# Patient Record
Sex: MALE | Race: WHITE | NOT HISPANIC OR LATINO | URBAN - METROPOLITAN AREA
[De-identification: names, ages, dates, MRNs, and addresses within clinical notes are randomized per-mention and may not be internally consistent; named-entity substitution may affect disease eponyms.]

---

## 2019-04-06 ENCOUNTER — OFFICE VISIT (OUTPATIENT)
Dept: URGENT CARE | Facility: CLINIC | Age: 54
End: 2019-04-06
Payer: COMMERCIAL

## 2019-04-06 VITALS
WEIGHT: 174 LBS | SYSTOLIC BLOOD PRESSURE: 132 MMHG | TEMPERATURE: 96.5 F | RESPIRATION RATE: 16 BRPM | OXYGEN SATURATION: 97 % | HEIGHT: 67 IN | DIASTOLIC BLOOD PRESSURE: 74 MMHG | HEART RATE: 80 BPM | BODY MASS INDEX: 27.31 KG/M2

## 2019-04-06 DIAGNOSIS — R42 DIZZINESS AND GIDDINESS: Primary | ICD-10-CM

## 2019-04-06 PROCEDURE — G0382 LEV 3 HOSP TYPE B ED VISIT: HCPCS | Performed by: NURSE PRACTITIONER

## 2019-04-06 RX ORDER — ROPINIROLE 1 MG/1
1 TABLET, FILM COATED ORAL
COMMUNITY

## 2021-04-13 DIAGNOSIS — Z23 ENCOUNTER FOR IMMUNIZATION: ICD-10-CM

## 2021-04-22 ENCOUNTER — IMMUNIZATIONS (OUTPATIENT)
Dept: FAMILY MEDICINE CLINIC | Facility: HOSPITAL | Age: 56
End: 2021-04-22

## 2021-04-22 DIAGNOSIS — Z23 ENCOUNTER FOR IMMUNIZATION: Primary | ICD-10-CM

## 2021-04-22 PROCEDURE — 91300 SARS-COV-2 / COVID-19 MRNA VACCINE (PFIZER-BIONTECH) 30 MCG: CPT | Performed by: NURSE PRACTITIONER

## 2021-04-22 PROCEDURE — 0001A SARS-COV-2 / COVID-19 MRNA VACCINE (PFIZER-BIONTECH) 30 MCG: CPT | Performed by: NURSE PRACTITIONER

## 2021-05-21 ENCOUNTER — IMMUNIZATIONS (OUTPATIENT)
Dept: FAMILY MEDICINE CLINIC | Facility: HOSPITAL | Age: 56
End: 2021-05-21

## 2021-05-21 DIAGNOSIS — Z23 ENCOUNTER FOR IMMUNIZATION: Primary | ICD-10-CM

## 2021-05-21 PROCEDURE — 91300 SARS-COV-2 / COVID-19 MRNA VACCINE (PFIZER-BIONTECH) 30 MCG: CPT

## 2021-05-21 PROCEDURE — 0002A SARS-COV-2 / COVID-19 MRNA VACCINE (PFIZER-BIONTECH) 30 MCG: CPT

## 2022-12-17 ENCOUNTER — OFFICE VISIT (OUTPATIENT)
Dept: URGENT CARE | Facility: CLINIC | Age: 57
End: 2022-12-17

## 2022-12-17 VITALS
DIASTOLIC BLOOD PRESSURE: 97 MMHG | TEMPERATURE: 97.9 F | RESPIRATION RATE: 18 BRPM | OXYGEN SATURATION: 98 % | HEART RATE: 68 BPM | SYSTOLIC BLOOD PRESSURE: 158 MMHG

## 2022-12-17 DIAGNOSIS — M65.311 TRIGGER FINGER OF RIGHT THUMB: Primary | ICD-10-CM

## 2022-12-17 RX ORDER — GABAPENTIN 600 MG/1
TABLET ORAL
COMMUNITY
Start: 2022-12-03

## 2022-12-17 RX ORDER — CLONAZEPAM 0.5 MG/1
0.5 TABLET ORAL
COMMUNITY
Start: 2022-12-02

## 2022-12-17 RX ORDER — GABAPENTIN 300 MG/1
CAPSULE ORAL
COMMUNITY
Start: 2022-09-27

## 2022-12-17 RX ORDER — ATORVASTATIN CALCIUM 10 MG/1
10 TABLET, FILM COATED ORAL DAILY
COMMUNITY
Start: 2022-12-06

## 2022-12-17 RX ORDER — GABAPENTIN 300 MG/1
CAPSULE ORAL
COMMUNITY
Start: 2022-12-03

## 2022-12-17 NOTE — PROGRESS NOTES
[facility]        NAME: Leanna Montoya is a 61 y o  male  : 1965    MRN: [id number]  DATE: 2022  TIME: 11:09 AM    Assessment and Plan   Trigger finger of right thumb [M65 311]  1  Trigger finger of right thumb  Ambulatory Referral to Hand Surgery    Ambulatory Referral to Physical Therapy            Patient Instructions   Over-the-counter thumb spica brace  Tylenol and ibuprofen  Gentle range of motion  Physical therapy  Orthopedic surgery  Follow up with PCP in 3-5 days  Proceed to  ER if symptoms worsen  Chief Complaint     Chief Complaint   Patient presents with   • Thumb Pain     Pt reports right thumb pain and stiffness for 2 weeks  History of Present Illness       Patient is a 26-year-old male with no significant past medical history presents the office complaining of right thumb pain for 2 weeks  Pain is rated 6/10  States it gets stuck in a flexed position and he has to forcibly extend it to release  States this causes most noticeable pain  He also reports decreased sensation to the tip of the thumb  Denies any injury or trauma  He made at home splint but states it was to be annoying to wear  He has not been using anything for pain  He is right-hand dominant  Review of Systems   Review of Systems   Musculoskeletal: Positive for arthralgias and joint swelling           Current Medications       Current Outpatient Medications:   •  atorvastatin (LIPITOR) 10 mg tablet, Take 10 mg by mouth daily, Disp: , Rfl:   •  gabapentin (NEURONTIN) 300 mg capsule, Take 1 capsule (300 mg) by mouth at bedtime, three capsules at 8 PM, 3 capsules at 1 AM , Disp: , Rfl:   •  gabapentin (NEURONTIN) 300 mg capsule, TAKE 1 CAPSULE BY MOUTH AT 5:00 PM, TAKE 1 CAPSULE AT 8:00 PM, AND TAKE 1 CAPSULE AT 1:00 AM  TAKE ALONG WITH 600 MG TABLET AT 8:00 PM AND 1:00 AM , Disp: , Rfl:   •  gabapentin (NEURONTIN) 600 MG tablet, TAKE 1 TABLET BY MOUTH AT 8:00 PM, AND TAKE 1 TABLET BY MOUTH AT 1:00 AM  TAKE ALONG WITH 300 MG CAPSULE AT 8:00PM AND 1:00AM , Disp: , Rfl:   •  rOPINIRole (REQUIP) 1 mg tablet, Take 1 mg by mouth daily at bedtime, Disp: , Rfl:   •  clonazePAM (KlonoPIN) 0 5 mg tablet, Take 0 5 mg by mouth daily at bedtime (Patient not taking: Reported on 12/17/2022), Disp: , Rfl:   •  Gabapentin Enacarbil  MG TBCR, Take 300 mg by mouth 2 (two) times a day (Patient not taking: Reported on 12/17/2022), Disp: , Rfl:     Current Allergies     Allergies as of 12/17/2022   • (No Known Allergies)            The following portions of the patient's history were reviewed and updated as appropriate: allergies, current medications, past family history, past medical history, past social history, past surgical history and problem list      History reviewed  No pertinent past medical history  History reviewed  No pertinent surgical history  History reviewed  No pertinent family history  Medications have been verified  Objective   /97   Pulse 68   Temp 97 9 °F (36 6 °C)   Resp 18   SpO2 98%   No LMP for male patient  Physical Exam     Physical Exam  Vitals and nursing note reviewed  Constitutional:       Appearance: He is well-developed  HENT:      Head: Normocephalic and atraumatic  Right Ear: External ear normal       Left Ear: External ear normal       Nose: Nose normal    Eyes:      General: Lids are normal       Conjunctiva/sclera: Conjunctivae normal    Musculoskeletal:      Right hand: Swelling and bony tenderness (MCP, proximal phalanx, and PIP 1st digit) present  Decreased range of motion (Notable pop with thumb flexion and extension)  Decreased sensation (Decreased sensation to the tip of right thumb)  Normal capillary refill  Normal pulse  Skin:     General: Skin is warm and dry  Capillary Refill: Capillary refill takes less than 2 seconds  Findings: No rash  Neurological:      Mental Status: He is alert

## 2022-12-19 NOTE — TELEPHONE ENCOUNTER
Patient is being referred to orthopedics  Please schedule accordingly      [facility]   [phone]

## 2022-12-19 NOTE — TELEPHONE ENCOUNTER
Caller:Patient    Doctor: hand specialist    Reason for call: patient calling to schedule an appt   Advised the  will be reaching out to him    Call back#: [phone]

## 2023-01-23 ENCOUNTER — EVALUATION (OUTPATIENT)
Dept: OCCUPATIONAL THERAPY | Facility: CLINIC | Age: 58
End: 2023-01-23

## 2023-01-23 DIAGNOSIS — M65.311 TRIGGER FINGER OF RIGHT THUMB: ICD-10-CM

## 2023-01-23 PROBLEM — M65.30 TRIGGER FINGER OF RIGHT HAND: Status: ACTIVE | Noted: 2023-01-23

## 2023-01-23 NOTE — PROGRESS NOTES
OT Evaluation     Today's date: 2023  Patient name: Paul Diaz  : 1965  MRN: [id number]  Referring provider: Maddi Loya, *  Dx:   Encounter Diagnosis     ICD-10-CM    1  Trigger finger of right thumb  M65 311 Ambulatory Referral to Physical Therapy                     Assessment  Assessment details: Patient present on  with R trigger finger of thumb  Patient has WFL in wrist and digits and complains of pain only with function  Palpation of thumb resulted in clicking of R IP and MCP joint of thumb  Patient has no complaints of tenderness at R MCP  Patient is a  has trouble lifting and grasping objects at work     Impairments: pain with function  Functional limitations: Lifting objects at workUnderstanding of Dx/Px/POC: good   Prognosis: good    Goals  STG:  Adhere to HEP by 1wk  Decrease pain with function by 1    LTG:  Decrease at most pain with function to 2   Decrease resting pain to 1 by discharge  Increase FOTO score WFL by discharge    Plan  Patient would benefit from: custom splinting, skilled occupational therapy and OT eval  Planned modality interventions: cryotherapy and hydrotherapy  Planned therapy interventions: joint mobilization, behavior modification, manual therapy, massage, therapeutic exercise, therapeutic activities, functional ROM exercises, graded activity, graded exercise and home exercise program  Frequency: 2x week  Duration in visits: 10  Duration in weeks: 5  Plan of Care beginning date: 2023  Plan of Care expiration date: 2023  Treatment plan discussed with: patient        Subjective Evaluation    History of Present Illness  Date of onset: 12/10/2022          Not a recurrent problem   Quality of life: good    Pain  Current pain ratin  At best pain ratin  At worst pain rating: 3  Location: right mcp of thumb  Quality: discomfort  Relieving factors: rest    Social Support  Steps to enter house: no  Stairs in house: yes   Lives in: one-story house  Lives with: spouse and young children    Employment status: working ([employer])  Hand dominance: right          Objective     Palpation     Additional Palpation Details  Clicking of R IP thumb  No complaints of tenderness around right MCP or IP of thumb  Denies paraesthesia     Active Range of Motion     Right Wrist   Wrist flexion: WFL  Wrist extension: WFl  Radial deviation: WFL  Ulnar deviation: WFL    Right Thumb   Flexion     CMC: 35    MP: 65    DIP: 50  Palmar Abduction    CMC: 78  Opposition: WFL, can slide thumb down 5th digit to distal palmar crease   WFL on thumb extension    Additional Active Range of Motion Details  Right wrist WFL, no pain  Digits ROM WFL    Strength/Myotome Testing     Right Wrist/Hand   Wrist extension: WFL  Wrist flexion: WFL  Radial deviation: WFL  Ulnar deviation: WFL     (2nd hand position)     Trial 1: 75    Trial 2: 70    Thumb Strength   Key/Lateral Pinch     Trial 1: 19    Trial 2: 20  Tip/Two-Point Pinch     Trial 1: 10    Trial 2: 12  Palmar/Three-Point Pinch     Trial 1: 15    Trial 2: 17    Additional Strength Details  Pain with R MCP flexion    Swelling     Right Wrist/Hand     Additional Swelling Details  R IP thumb 7cm  L IP thumb 6 5    General Comments:      Shoulder Comments   WFL    Elbow Comments   WFL             Precautions: Pain with Funciton      Manuals 1/23            Retrograde MSG 4m            Crossfriction 2m            AROM 2m                         HEP 5x10                                                                                                       Ther Ex             AAROM Digits 4m            Flex  Y  3x10            Hammer             Functional Grasp                                                                 Ther Activity             pegs             Grooved pegs             Gait Training                                       Modalities             MH 8m            CP

## 2023-01-26 ENCOUNTER — OFFICE VISIT (OUTPATIENT)
Dept: OCCUPATIONAL THERAPY | Facility: CLINIC | Age: 58
End: 2023-01-26

## 2023-01-26 DIAGNOSIS — M65.311 TRIGGER FINGER OF RIGHT THUMB: Primary | ICD-10-CM

## 2023-01-26 NOTE — PROGRESS NOTES
Daily Note     Today's date: 2023  Patient name: Efren Mcfarland  : 1965  MRN: [id number]  Referring provider: Anel Donnelly, *  Dx:   Encounter Diagnosis     ICD-10-CM    1  Trigger finger of right thumb  M65 311                      Subjective: I am sore      Objective: See treatment diary below      Assessment: Tolerated treatment well  Patient has improved pain post tx       Plan: Continue per plan of care        Precautions: Pain with Funciton      Manuals            Retrograde MSG 4m 4m           Crossfriction 2m 2m           AROM 2m 2m                        HEP 5x10                                                                                                       Ther Ex             AAROM Digits 4m 4m           Flex  Y  3x10 y  3x10           Hammer  3x10           Functional Grasp  3x10                                                               Ther Activity             pegs             Grooved pegs             Gait Training                                       Modalities             MH 8m 8m           CP  5m
Fair

## 2023-01-30 ENCOUNTER — APPOINTMENT (OUTPATIENT)
Dept: OCCUPATIONAL THERAPY | Facility: CLINIC | Age: 58
End: 2023-01-30

## 2023-01-30 NOTE — TELEPHONE ENCOUNTER
Caller: patient    Doctor: Sulma Single    Reason for call: patient has to reschedule Ot   Provided number and transferred    Call back#: [phone]

## 2023-02-02 ENCOUNTER — OFFICE VISIT (OUTPATIENT)
Dept: OCCUPATIONAL THERAPY | Facility: CLINIC | Age: 58
End: 2023-02-02

## 2023-02-02 DIAGNOSIS — M65.311 TRIGGER FINGER OF RIGHT THUMB: Primary | ICD-10-CM

## 2023-02-02 NOTE — PROGRESS NOTES
Daily Note     Today's date: 2023  Patient name: Didier Hassan  : 1965  MRN: [id number]  Referring provider: Dirk Padilla, *  Dx:   Encounter Diagnosis     ICD-10-CM    1  Trigger finger of right thumb  M65 311                      Subjective: I am doing better      Objective: See treatment diary below      Assessment: Tolerated treatment well  Patient has improved triggering   Plan: Continue per plan of care        Precautions: Pain with Funciton      Manuals           Retrograde MSG 4m 4m 4m          Crossfriction 2m 2m 2m          AROM 2m 2m 2m                       HEP 5x10                                                                                                       Ther Ex             AAROM Digits 4m 4m 4m          Flex  Y  3x10 y  3x10 y  3x10          Hammer  3x10 3x10          Functional Grasp  3x10 3x10                                                              Ther Activity             pegs             Grooved pegs             Gait Training                                       Modalities             MH 8m 8m 8m          CP  5m 5m

## 2023-02-06 VITALS
SYSTOLIC BLOOD PRESSURE: 142 MMHG | WEIGHT: 181 LBS | BODY MASS INDEX: 28.41 KG/M2 | DIASTOLIC BLOOD PRESSURE: 86 MMHG | HEIGHT: 67 IN

## 2023-02-06 DIAGNOSIS — M65.311 TRIGGER FINGER OF RIGHT THUMB: Primary | ICD-10-CM

## 2023-02-06 RX ORDER — LIDOCAINE HYDROCHLORIDE 5 MG/ML
1 INJECTION, SOLUTION INFILTRATION; PERINEURAL
Status: COMPLETED | OUTPATIENT
Start: 2023-02-06 | End: 2023-02-06

## 2023-02-06 RX ORDER — BETAMETHASONE SODIUM PHOSPHATE AND BETAMETHASONE ACETATE 3; 3 MG/ML; MG/ML
6 INJECTION, SUSPENSION INTRA-ARTICULAR; INTRALESIONAL; INTRAMUSCULAR; SOFT TISSUE
Status: COMPLETED | OUTPATIENT
Start: 2023-02-06 | End: 2023-02-06

## 2023-02-06 RX ADMIN — LIDOCAINE HYDROCHLORIDE 1 ML: 5 INJECTION, SOLUTION INFILTRATION; PERINEURAL at 15:54

## 2023-02-06 RX ADMIN — BETAMETHASONE SODIUM PHOSPHATE AND BETAMETHASONE ACETATE 6 MG: 3; 3 INJECTION, SUSPENSION INTRA-ARTICULAR; INTRALESIONAL; INTRAMUSCULAR; SOFT TISSUE at 15:54

## 2023-02-06 NOTE — PROGRESS NOTES
ASSESSMENT/PLAN:    Assessment:   Trigger Finger  right  thumb    Plan:   Resume activities as tolerated, steroid injections, NSAIDs, therapy and ice    Follow Up:  6  week(s)    To Do Next Visit:  Re evaluation of right trigger thumb    General Discussions:  Trigger FInger: The anatomy and physiology of trigger finger was discussed with the patient today in the office  Edema and increased contact pressure within the flexor tendons at the A1 pulley can cause pain, crepitation, and limitation of function  Treatment options include resting MP blocking splints to decrease edema, oral anti-inflammatory medications, home or formal therapy exercises, up to 2 steroid injections within the tendon sheath, or surgical release  While majority of patients do respond to conservative treatment, up to 20% may require surgical release      _____________________________________________________  CHIEF COMPLAINT:  Chief Complaint   Patient presents with   • Right Thumb - Locking, Triggering, Clicking         SUBJECTIVE:  Toyin Jones is a 61y o  year old male who presents today for an orthopedic surgery consultation visit at the request of Zackary White PA-C on 12/17/22 with Pain  Mild  Intermittant  Dull and Aching, Catching and Locking to the right thumb  This started  2 month(s) ago as Insidious  Radiation: None  Previous Treatments: therapy with relief  Associated symptoms: None    PAST MEDICAL HISTORY:  History reviewed  No pertinent past medical history  PAST SURGICAL HISTORY:  History reviewed  No pertinent surgical history  FAMILY HISTORY:  History reviewed  No pertinent family history      SOCIAL HISTORY:  Social History     Tobacco Use   • Smoking status: Never   • Smokeless tobacco: Never       MEDICATIONS:    Current Outpatient Medications:   •  atorvastatin (LIPITOR) 10 mg tablet, Take 10 mg by mouth daily, Disp: , Rfl:   •  gabapentin (NEURONTIN) 300 mg capsule, TAKE 1 CAPSULE BY MOUTH AT 5:00 PM, TAKE 1 CAPSULE AT 8:00 PM, AND TAKE 1 CAPSULE AT 1:00 AM  TAKE ALONG WITH 600 MG TABLET AT 8:00 PM AND 1:00 AM , Disp: , Rfl:   •  gabapentin (NEURONTIN) 600 MG tablet, TAKE 1 TABLET BY MOUTH AT 8:00 PM, AND TAKE 1 TABLET BY MOUTH AT 1:00 AM  TAKE ALONG WITH 300 MG CAPSULE AT 8:00PM AND 1:00AM , Disp: , Rfl:   •  rOPINIRole (REQUIP) 1 mg tablet, Take 1 mg by mouth daily at bedtime, Disp: , Rfl:     ALLERGIES:  No Known Allergies    REVIEW OF SYSTEMS:  Pertinent items are noted in HPI  A comprehensive review of systems was negative  LABS:  HgA1c: No results found for: HGBA1C  BMP: No results found for: GLUCOSE, CALCIUM, NA, K, CO2, CL, BUN, CREATININE      _____________________________________________________  PHYSICAL EXAMINATION:  General: well developed and well nourished, alert, oriented times 3 and appears comfortable  Psychiatric: Normal  HEENT: Trachea Midline, No torticollis  Cardiovascular: No discernable arrhythmia  Pulmonary: No wheezing or stridor  Skin: No masses, erythema, lacerations, fluctation, ulcerations  Neurovascular: Sensation Intact to the Median, Ulnar, Radial Nerve, Motor Intact to the Median, Ulnar, Radial Nerve and Pulses Intact    MUSCULOSKELETAL EXAMINATION:  RIGHT SIDE:  Finger:  No tenderness, No instability, Full ROM, Triggering  thumb, Palpable clicking index finger, thumb and Crepitation index finger, thumb    _____________________________________________________  STUDIES REVIEWED:  No Studies to review      PROCEDURES PERFORMED:  Hand/upper extremity injection: R thumb A1  Universal Protocol:  Consent: Verbal consent obtained  Risks and benefits: risks, benefits and alternatives were discussed  Consent given by: patient  Site marked: the operative site was marked  Radiology Images displayed and confirmed   If images not available, report reviewed: imaging studies available  Patient identity confirmed: verbally with patient    Supporting Documentation  Indications: diagnostic, pain, tendon swelling and therapeutic   Procedure Details  Condition:trigger finger Location: thumb - R thumb A1   Preparation: Patient was prepped and draped in the usual sterile fashion  Needle size: 25 G  Ultrasound guidance: no  Approach: volar  Medications administered: 1 mL lidocaine 0 5 %; 6 mg betamethasone acetate-betamethasone sodium phosphate 6 (3-3) mg/mL    Patient tolerance: patient tolerated the procedure well with no immediate complications  Dressing:  Sterile dressing applied               Scribe Attestation    I,:  Miah Carbajal am acting as a scribe while in the presence of the attending physician :       I,:  Bharati Chen MD personally performed the services described in this documentation    as scribed in my presence :           Scribe Attestation    I,:  Miah Carbajal am acting as a scribe while in the presence of the attending physician :       I,:  Bharati Chen MD personally performed the services described in this documentation    as scribed in my presence :

## 2023-02-06 NOTE — PATIENT INSTRUCTIONS
What is it TRIGGER FINGER? Stenosing tenosynovitis, commonly known as “trigger finger” or“ trigger thumb”, involves the pulleys and tendons in the hand that bend the fingers  The tendons work like long ropes connecting the muscles of the forearm with the bones of the fingers and thumb  In the finger, the pulleys are a series of rings that form a tunnel through which the tendons must glide, much like the guides on a fishing rex through which the line (or tendon) must pass  These pulleys hold the tendons close against the bone  The tendons and the tunnel have a slick lining that allows easy gliding of the tendon through the pulleys (see Figure 1)  Trigger finger/thumb occurs when the pulley at the base of the finger becomes too thick and constricting around the tendon, making it hard for the tendon to move freely through the pulley  Sometimes the tendon develops a nodule (knot) or swelling of its lining  Because of the increased resistance to the gliding of the tendon through the  pulley, one may feel pain, popping, or a catching feeling in the finger or thumb (see Figure 2)  When the tendon catches, it produces irritation and more swelling  This causes a vicious cycle of triggering, irritation, and swelling  Sometimes the finger becomes stuck or locked, and is hard to straighten or bend  What causes it? Causes for this condition are not always clear  Some trigger fingers are associated with medical conditions such as rheumatoid arthritis, gout, and diabetes  Local trauma to the palm/base of the finger may be a factor on occasion, but in most cases there is not a clear cause  Signs and symptoms   Trigger finger/thumb may start with discomfort felt at the base of the finger or thumb, where they join the palm  This area is often tender to local pressure  A nodule may sometimes be found in this area   When the finger begins to trigger or lock, the patient may think the  problem is at the middle knuckle of the finger or the tip knuckle of the thumb, since the tendon that is sticking is the one that moves these joints  Treatment  The goal of treatment in trigger finger/thumb is to eliminate the catching or locking and allow full movement of the finger or thumb without discomfort  Swelling around the flexor tendon and tendon sheath must be reduced to allow smooth gliding of the tendon  The wearing of a splint or taking an oral anti-inflammatory medication may sometimes  help  Treatment may also include changing activities to reduce swelling  An injection of steroid into the area around the tendon and pulley is often effective in relieving the trigger finger/thumb  If non-surgical forms of treatment do not relieve the symptoms, surgery may be recommended  This surgery is performed as an outpatient, usually with simple local anesthesia  The goal of surgery is to open the pulley at the base of the finger so that the tendon can glide more freely (see Figure 3)  Active motion of the finger generally begins immediately after surgery  Normal use of the hand can usually be resumed once comfort permits  Some patients may feel tenderness, discomfort, and swelling about the area of their surgery longer than others  Occasionally, hand therapy is required after surgery to regain  better use  © 2012 [employer]  www handcare  org

## 2023-06-05 ENCOUNTER — OFFICE VISIT (OUTPATIENT)
Age: 58
End: 2023-06-05
Payer: COMMERCIAL

## 2023-06-05 ENCOUNTER — TELEPHONE (OUTPATIENT)
Dept: GASTROENTEROLOGY | Facility: CLINIC | Age: 58
End: 2023-06-05

## 2023-06-05 VITALS
DIASTOLIC BLOOD PRESSURE: 96 MMHG | SYSTOLIC BLOOD PRESSURE: 142 MMHG | HEIGHT: 67 IN | BODY MASS INDEX: 27.21 KG/M2 | WEIGHT: 173.4 LBS

## 2023-06-05 DIAGNOSIS — R19.4 CHANGE IN BOWEL HABITS: Primary | ICD-10-CM

## 2023-06-05 PROBLEM — E78.49 OTHER HYPERLIPIDEMIA: Status: ACTIVE | Noted: 2023-06-05

## 2023-06-05 PROBLEM — G25.81 RESTLESS LEG SYNDROME: Status: ACTIVE | Noted: 2023-06-05

## 2023-06-05 PROCEDURE — 99203 OFFICE O/P NEW LOW 30 MIN: CPT | Performed by: INTERNAL MEDICINE

## 2023-06-05 RX ORDER — ROTIGOTINE 1 MG/24H
PATCH, EXTENDED RELEASE TRANSDERMAL
COMMUNITY
Start: 2023-05-17

## 2023-06-05 NOTE — TELEPHONE ENCOUNTER
Scheduled date of colonoscopy (as of today):7/14/23  Physician performing colonoscopy:JESUS  Location of colonoscopy:[facility]  Bowel prep reviewed with patient:Clenpiq  Instructions reviewed with patient by:SASHA  Clearances: N

## 2023-06-05 NOTE — PROGRESS NOTES
[id number] - Outpatient Consultation  Mainor Pérez 61 y o  male MRN: [id number]  Encounter: [id number]          ASSESSMENT AND PLAN:      1  Change in bowel habits  Probably secondary to alterations in diet as well as not taking his fiber consistently  Suspect small amount of blood when wiping related to hemorrhoids  -Encouraged taking fiber consistently with large glass of water  -Hemorrhoid cream as needed  - Colonoscopy; Future    ______________________________________________________________________    HPI: The patient is seen in the office today for evaluation of a change in bowel habits and rectal bleeding  He reports that he had issues with his bowels in the distant past and was supposed to be taking fiber but admits he had not been taking it regularly  Several months ago he began having issues with incomplete evacuation as well as some seepage  He reports having a bowel movement in the morning that was somewhat hard  He reports that 1/2-hour later having to go and wiped himself again with some brown liquid  He occasionally would move his bowels about an hour later as well  He reports that if he strained he would see blood when he wiped  He denies any abdominal pain  His weight has been stable  He denies any upper GI symptoms  He denies any change in his medications  He reports that his diet has been somewhat more erratic related to his triplets all graduating from high school so his wife and him do not eat as regular as they did previously  He has had 2 colonoscopies in the past, 1 in his 35s and 1 around age 47  He began taking his fiber more consistently at a dose of 3 caplets twice a day and his bowels seem to be a little more regular      REVIEW OF SYSTEMS:    CONSTITUTIONAL: Denies any fever, chills, rigors, and weight loss  HEENT: No earache or tinnitus  Denies hearing loss or visual disturbances  CARDIOVASCULAR: No chest pain or palpitations  "  RESPIRATORY: Denies any cough, hemoptysis, shortness of breath or dyspnea on exertion  GASTROINTESTINAL: As noted in the History of Present Illness  GENITOURINARY: No problems with urination  Denies any hematuria or dysuria  NEUROLOGIC: No dizziness or vertigo, denies headaches  MUSCULOSKELETAL: Denies any muscle or joint pain  SKIN: Denies skin rashes or itching  ENDOCRINE: Denies excessive thirst  Denies intolerance to heat or cold  PSYCHOSOCIAL: Denies depression or anxiety  Denies any recent memory loss  Historical Information   History reviewed  No pertinent past medical history  History reviewed  No pertinent surgical history  Restless leg syndrome  Hyperlipidemia  Vasectomy  Social History   Social History     Substance and Sexual Activity   Alcohol Use None     Social History     Substance and Sexual Activity   Drug Use Not on file     Social History     Tobacco Use   Smoking Status Never   Smokeless Tobacco Never     History reviewed  No pertinent family history  Meds/Allergies       Current Outpatient Medications:   •  atorvastatin (LIPITOR) 10 mg tablet  •  gabapentin (NEURONTIN) 300 mg capsule  •  gabapentin (NEURONTIN) 600 MG tablet  •  Neupro 1 MG/24HR transdermal patch  •  psyllium (METAMUCIL) 0 52 g capsule  •  rOPINIRole (REQUIP) 1 mg tablet    No Known Allergies        Objective     Blood pressure 142/96, height 5' 7\" (1 702 m), weight 78 7 kg (173 lb 6 4 oz)  Body mass index is 27 16 kg/m²  PHYSICAL EXAM:      General Appearance:   Alert, cooperative, no distress   HEENT:   Normocephalic, atraumatic, anicteric      Neck:  Supple, symmetrical, trachea midline   Lungs:   Clear to auscultation bilaterally; no rales, rhonchi or wheezing; respirations unlabored    Heart[de-identified]   Regular rate and rhythm; no murmur, rub, or gallop     Abdomen:   Soft, non-tender, non-distended; normal bowel sounds; no masses, no organomegaly    Genitalia:   Deferred    Rectal:   Deferred  " Extremities:  No cyanosis, clubbing or edema    Pulses:  2+ and symmetric    Skin:  No jaundice, rashes, or lesions    Lymph nodes:  No palpable cervical lymphadenopathy        Lab Results:   No visits with results within 1 Day(s) from this visit  Latest known visit with results is:   No results found for any previous visit  Radiology Results:   No results found

## 2023-06-05 NOTE — LETTER
June 5, 2023     Kevin Hinton DO  [address] [facility] [address]    Patient: Zoey Hendrix   YOB: 1965   Date of Visit: 6/5/2023       Dear Dr Shankar Alexander: Thank you for referring Ora Cochran to me for evaluation  Below are my notes for this consultation  If you have questions, please do not hesitate to call me  I look forward to following your patient along with you  Sincerely,        Keri Stone MD        CC: No Recipients    Keri Stone MD  6/5/2023  8:27 AM  Sign when Signing Visit  [id number] - Outpatient Consultation  Zoey Hendrix 61 y o  male MRN: [id number]  Encounter: [id number]          ASSESSMENT AND PLAN:      1  Change in bowel habits  Probably secondary to alterations in diet as well as not taking his fiber consistently  Suspect small amount of blood when wiping related to hemorrhoids  -Encouraged taking fiber consistently with large glass of water  -Hemorrhoid cream as needed  - Colonoscopy; Future    ______________________________________________________________________    HPI: The patient is seen in the office today for evaluation of a change in bowel habits and rectal bleeding  He reports that he had issues with his bowels in the distant past and was supposed to be taking fiber but admits he had not been taking it regularly  Several months ago he began having issues with incomplete evacuation as well as some seepage  He reports having a bowel movement in the morning that was somewhat hard  He reports that 1/2-hour later having to go and wiped himself again with some brown liquid  He occasionally would move his bowels about an hour later as well  He reports that if he strained he would see blood when he wiped  He denies any abdominal pain  His weight has been stable  He denies any upper GI symptoms  He denies any change in his medications    He reports that his diet has been somewhat more erratic "related to his triplets all graduating from high school so his wife and him do not eat as regular as they did previously  He has had 2 colonoscopies in the past, 1 in his 35s and 1 around age 47  He began taking his fiber more consistently at a dose of 3 caplets twice a day and his bowels seem to be a little more regular      REVIEW OF SYSTEMS:    CONSTITUTIONAL: Denies any fever, chills, rigors, and weight loss  HEENT: No earache or tinnitus  Denies hearing loss or visual disturbances  CARDIOVASCULAR: No chest pain or palpitations  RESPIRATORY: Denies any cough, hemoptysis, shortness of breath or dyspnea on exertion  GASTROINTESTINAL: As noted in the History of Present Illness  GENITOURINARY: No problems with urination  Denies any hematuria or dysuria  NEUROLOGIC: No dizziness or vertigo, denies headaches  MUSCULOSKELETAL: Denies any muscle or joint pain  SKIN: Denies skin rashes or itching  ENDOCRINE: Denies excessive thirst  Denies intolerance to heat or cold  PSYCHOSOCIAL: Denies depression or anxiety  Denies any recent memory loss  Historical Information   History reviewed  No pertinent past medical history  History reviewed  No pertinent surgical history  Restless leg syndrome  Hyperlipidemia  Vasectomy  Social History   Social History     Substance and Sexual Activity   Alcohol Use None     Social History     Substance and Sexual Activity   Drug Use Not on file     Social History     Tobacco Use   Smoking Status Never   Smokeless Tobacco Never     History reviewed  No pertinent family history      Meds/Allergies       Current Outpatient Medications:   •  atorvastatin (LIPITOR) 10 mg tablet  •  gabapentin (NEURONTIN) 300 mg capsule  •  gabapentin (NEURONTIN) 600 MG tablet  •  Neupro 1 MG/24HR transdermal patch  •  psyllium (METAMUCIL) 0 52 g capsule  •  rOPINIRole (REQUIP) 1 mg tablet    No Known Allergies        Objective     Blood pressure 142/96, height 5' 7\" (1 702 m), weight 78 7 " kg (173 lb 6 4 oz)  Body mass index is 27 16 kg/m²  PHYSICAL EXAM:      General Appearance:   Alert, cooperative, no distress   HEENT:   Normocephalic, atraumatic, anicteric  Neck:  Supple, symmetrical, trachea midline   Lungs:   Clear to auscultation bilaterally; no rales, rhonchi or wheezing; respirations unlabored    Heart[de-identified]   Regular rate and rhythm; no murmur, rub, or gallop  Abdomen:   Soft, non-tender, non-distended; normal bowel sounds; no masses, no organomegaly    Genitalia:   Deferred    Rectal:   Deferred    Extremities:  No cyanosis, clubbing or edema    Pulses:  2+ and symmetric    Skin:  No jaundice, rashes, or lesions    Lymph nodes:  No palpable cervical lymphadenopathy        Lab Results:   No visits with results within 1 Day(s) from this visit  Latest known visit with results is:   No results found for any previous visit  Radiology Results:   No results found

## 2023-06-29 ENCOUNTER — TELEPHONE (OUTPATIENT)
Dept: GASTROENTEROLOGY | Facility: CLINIC | Age: 58
End: 2023-06-29

## 2023-06-29 DIAGNOSIS — Z12.11 SCREENING FOR COLON CANCER: Primary | ICD-10-CM

## 2023-06-29 RX ORDER — SODIUM PICOSULFATE, MAGNESIUM OXIDE, AND ANHYDROUS CITRIC ACID 12; 3.5; 1 G/175ML; G/175ML; MG/175ML
175 LIQUID ORAL 2 TIMES DAILY
Qty: 350 ML | Refills: 0 | Status: SHIPPED | OUTPATIENT
Start: 2023-06-29

## 2023-06-29 NOTE — TELEPHONE ENCOUNTER
Procedure confirmed  Colonoscopy     Via: [facility]    Instructions given: [employer]     Prep Given: Clenpiq    Call the office if there are any questions

## 2023-07-13 ENCOUNTER — ANESTHESIA EVENT (OUTPATIENT)
Dept: ANESTHESIOLOGY | Facility: AMBULATORY SURGERY CENTER | Age: 58
End: 2023-07-13

## 2023-07-13 ENCOUNTER — ANESTHESIA (OUTPATIENT)
Dept: ANESTHESIOLOGY | Facility: AMBULATORY SURGERY CENTER | Age: 58
End: 2023-07-13

## 2023-07-14 ENCOUNTER — ANESTHESIA (OUTPATIENT)
Dept: GASTROENTEROLOGY | Facility: AMBULATORY SURGERY CENTER | Age: 58
End: 2023-07-14

## 2023-07-14 ENCOUNTER — ANESTHESIA EVENT (OUTPATIENT)
Dept: GASTROENTEROLOGY | Facility: AMBULATORY SURGERY CENTER | Age: 58
End: 2023-07-14

## 2023-07-14 ENCOUNTER — HOSPITAL ENCOUNTER (OUTPATIENT)
Dept: GASTROENTEROLOGY | Facility: AMBULATORY SURGERY CENTER | Age: 58
Discharge: HOME | End: 2023-07-14
Payer: COMMERCIAL

## 2023-07-14 VITALS
SYSTOLIC BLOOD PRESSURE: 124 MMHG | DIASTOLIC BLOOD PRESSURE: 72 MMHG | BODY MASS INDEX: 27.15 KG/M2 | OXYGEN SATURATION: 96 % | WEIGHT: 173 LBS | HEART RATE: 80 BPM | TEMPERATURE: 98 F | HEIGHT: 67 IN | RESPIRATION RATE: 18 BRPM

## 2023-07-14 DIAGNOSIS — R19.4 CHANGE IN BOWEL HABITS: ICD-10-CM

## 2023-07-14 PROCEDURE — 45378 DIAGNOSTIC COLONOSCOPY: CPT | Performed by: INTERNAL MEDICINE

## 2023-07-14 RX ORDER — PROPOFOL 10 MG/ML
INJECTION, EMULSION INTRAVENOUS AS NEEDED
Status: DISCONTINUED | OUTPATIENT
Start: 2023-07-14 | End: 2023-07-14

## 2023-07-14 RX ORDER — SODIUM CHLORIDE, SODIUM LACTATE, POTASSIUM CHLORIDE, CALCIUM CHLORIDE 600; 310; 30; 20 MG/100ML; MG/100ML; MG/100ML; MG/100ML
50 INJECTION, SOLUTION INTRAVENOUS CONTINUOUS
Status: DISCONTINUED | OUTPATIENT
Start: 2023-07-14 | End: 2023-07-14

## 2023-07-14 RX ADMIN — PROPOFOL 50 MG: 10 INJECTION, EMULSION INTRAVENOUS at 11:26

## 2023-07-14 RX ADMIN — SODIUM CHLORIDE, SODIUM LACTATE, POTASSIUM CHLORIDE, CALCIUM CHLORIDE 50 ML/HR: 600; 310; 30; 20 INJECTION, SOLUTION INTRAVENOUS at 10:48

## 2023-07-14 RX ADMIN — PROPOFOL 50 MG: 10 INJECTION, EMULSION INTRAVENOUS at 11:19

## 2023-07-14 RX ADMIN — PROPOFOL 120 MG: 10 INJECTION, EMULSION INTRAVENOUS at 11:17

## 2023-07-14 RX ADMIN — PROPOFOL 50 MG: 10 INJECTION, EMULSION INTRAVENOUS at 11:23

## 2023-07-14 NOTE — H&P
History and Physical - [facility]  Rd Crenshaw 60 y.o. male MRN: [id number]    HPI: Rd Crenshaw is a 60y.o. year old male who presents for colonoscopy secondary to change in bowel habits    REVIEW OF SYSTEMS: Per the HPI, and otherwise unremarkable. Historical Information   Past Medical History:   Diagnosis Date   • Hyperlipidemia    • Restless leg syndrome    • Sleep apnea      Past Surgical History:   Procedure Laterality Date   • HAND SURGERY       Social History   Social History     Substance and Sexual Activity   Alcohol Use Yes    Comment: rare     Social History     Substance and Sexual Activity   Drug Use Never     Social History     Tobacco Use   Smoking Status Never   Smokeless Tobacco Never     History reviewed. No pertinent family history. Meds/Allergies       Current Outpatient Medications:   •  atorvastatin (LIPITOR) 10 mg tablet  •  Neupro 1 MG/24HR transdermal patch  •  psyllium (METAMUCIL) 0.52 g capsule  •  sodium picosulfate, magnesium oxide, citric acid (Clenpiq) oral solution  •  gabapentin (NEURONTIN) 300 mg capsule  •  gabapentin (NEURONTIN) 600 MG tablet  •  rOPINIRole (REQUIP) 1 mg tablet    Current Facility-Administered Medications:   •  lactated ringers infusion, 50 mL/hr, Intravenous, Continuous, Continue from Pre-op at 07/14/23 1107    No Known Allergies    Objective     /80   Pulse 84   Temp 98 °F (36.7 °C) (Temporal)   Resp 15   Ht 5' 7" (1.702 m)   Wt 78.5 kg (173 lb)   SpO2 97%   BMI 27.10 kg/m²     PHYSICAL EXAM    Gen: NAD AAOx3  Head: Normocephalic, Atraumatic  CV: S1S2 RRR no m/r/g  CHEST: Clear b/l no c/r/w  ABD: soft, +BS NT/ND  EXT: no edema    ASSESSMENT/PLAN:  This is a 60y.o. year old male here for colonoscopy secondary to change in bowel habits, and he is stable and optimized for his procedure.

## 2023-07-14 NOTE — ANESTHESIA POSTPROCEDURE EVALUATION
Post-Op Assessment Note    CV Status:  Stable  Pain Score: 0    Pain management: adequate     Mental Status:  Sleepy   Hydration Status:  Euvolemic   PONV Controlled:  Controlled   Airway Patency:  Patent      Post Op Vitals Reviewed: Yes      Staff: Anesthesiologist, CRNA         No notable events documented.     BP   149/86   Temp      Pulse  100   Resp   18   SpO2   95

## 2023-07-14 NOTE — ANESTHESIA PREPROCEDURE EVALUATION
Procedure:  COLONOSCOPY    Relevant Problems   CARDIO   (+) Other hyperlipidemia   Restless leg Syndrome     Physical Exam    Airway    Mallampati score: II  TM Distance: >3 FB  Neck ROM: full     Dental   No notable dental hx     Cardiovascular      Pulmonary      Other Findings        Anesthesia Plan  ASA Score- 2     Anesthesia Type- IV sedation with anesthesia with ASA Monitors. Additional Monitors:   Airway Plan:           Plan Factors-Exercise tolerance (METS): >4 METS. Chart reviewed. Patient summary reviewed. Patient is not a current smoker. Induction- intravenous. Postoperative Plan-     Informed Consent- Anesthetic plan and risks discussed with patient. I personally reviewed this patient with the CRNA. Discussed and agreed on the Anesthesia Plan with the CRNA. Eddie Jean

## 2025-01-23 ENCOUNTER — OFFICE VISIT (OUTPATIENT)
Dept: GASTROENTEROLOGY | Facility: CLINIC | Age: 60
End: 2025-01-23
Payer: COMMERCIAL

## 2025-01-23 VITALS
BODY MASS INDEX: 26.76 KG/M2 | HEIGHT: 68 IN | SYSTOLIC BLOOD PRESSURE: 145 MMHG | WEIGHT: 176.6 LBS | DIASTOLIC BLOOD PRESSURE: 83 MMHG

## 2025-01-23 DIAGNOSIS — K64.8 INTERNAL HEMORRHOIDS: ICD-10-CM

## 2025-01-23 DIAGNOSIS — Z12.11 SCREENING FOR COLON CANCER: Primary | ICD-10-CM

## 2025-01-23 PROCEDURE — 46221 LIGATION OF HEMORRHOID(S): CPT | Performed by: INTERNAL MEDICINE

## 2025-01-23 RX ORDER — OXYCODONE HYDROCHLORIDE 5 MG/1
1 TABLET ORAL
COMMUNITY
Start: 2025-01-21

## 2025-01-23 NOTE — PROGRESS NOTES
[facility] - Hemorrhoid Banding Markie Workman 59 y.o. male MRN: [id number]  Encounter: [id number]    ASSESSMENT AND PLAN:    The right posterior hemorrhoid area was banded today. The patient was instructed to avoid constipation and straining, and educated in appropriate fiber intake.     HPI: Markie Workman is a 59 y.o. year old male who presents with bleeding due to hemorrhoids.     Previous treatments include: Conservative  Bands were previously placed: None  Current Fiber intake: Diet  Complications of prior therapy include: None    The patient provided consent for banding  and was placed in the left lateral position  Rectal exam showed normal  The [place] ligator was advanced and a band was applied without difficulty   Repeat rectal exam confirmed appropriate placement  The patient was discharged home after observation in the waiting area    Anal Excision Procedures    Performed by: Mo Sam MD  Authorized by: Mo Sam MD    Universal Protocol:     Written consent obtained?: Yes    A time out verifies correct patient, procedure, equipment, support staff and site/side marked as required:   Procedure Type: hemorrhoidectomy    Hemorrhoidectomy Details:   Hemorrhoid type: internal    Internal position:  Ten o'clock  Single rubber band ligation

## 2025-02-10 ENCOUNTER — OFFICE VISIT (OUTPATIENT)
Dept: GASTROENTEROLOGY | Facility: CLINIC | Age: 60
End: 2025-02-10
Payer: COMMERCIAL

## 2025-02-10 VITALS
SYSTOLIC BLOOD PRESSURE: 134 MMHG | HEIGHT: 68 IN | WEIGHT: 181 LBS | BODY MASS INDEX: 27.43 KG/M2 | DIASTOLIC BLOOD PRESSURE: 90 MMHG

## 2025-02-10 DIAGNOSIS — K64.8 INTERNAL HEMORRHOIDS: Primary | ICD-10-CM

## 2025-02-10 PROCEDURE — 46221 LIGATION OF HEMORRHOID(S): CPT | Performed by: INTERNAL MEDICINE

## 2025-02-10 RX ORDER — TADALAFIL 5 MG/1
5 TABLET ORAL DAILY
COMMUNITY
Start: 2025-02-07

## 2025-02-10 NOTE — PROGRESS NOTES
[facility] - Hemorrhoid Banding Markie Workman 59 y.o. male MRN: [id number]  Encounter: [id number]    ASSESSMENT AND PLAN:    The left lateral hemorrhoid area was banded today. The patient was instructed to avoid constipation and straining, and educated in appropriate fiber intake.     HPI: Markie Workman is a 59 y.o. year old male who presents with bleeding due to hemorrhoids.     Previous treatments include: Conservative  Bands were previously placed: Right posterior  Current Fiber intake: Fiber supplementation  Complications of prior therapy include: Anal discomfort    The patient provided consent for banding  and was placed in the left lateral position  Rectal exam showed normal  The [place] ligator was advanced and a band was applied without difficulty   Repeat rectal exam confirmed appropriate placement  The patient was discharged home after observation in the waiting area      Anal Excision Procedures    Performed by: oM Sam MD  Authorized by: Mo Sam MD    Universal Protocol:     Written consent obtained?: Yes    A time out verifies correct patient, procedure, equipment, support staff and site/side marked as required:   Procedure Type: hemorrhoidectomy    Hemorrhoidectomy Details:   Hemorrhoid type: internal    Internal position:  Six o'clock  Single rubber band ligation

## 2025-02-22 PROBLEM — Z12.11 SCREENING FOR COLON CANCER: Status: RESOLVED | Noted: 2025-01-23 | Resolved: 2025-02-22

## 2025-03-06 ENCOUNTER — OFFICE VISIT (OUTPATIENT)
Age: 60
End: 2025-03-06
Payer: COMMERCIAL

## 2025-03-06 VITALS
SYSTOLIC BLOOD PRESSURE: 132 MMHG | WEIGHT: 176 LBS | DIASTOLIC BLOOD PRESSURE: 86 MMHG | HEIGHT: 68 IN | BODY MASS INDEX: 26.67 KG/M2

## 2025-03-06 DIAGNOSIS — K64.8 INTERNAL HEMORRHOIDS: Primary | ICD-10-CM

## 2025-03-06 PROCEDURE — 46221 LIGATION OF HEMORRHOID(S): CPT | Performed by: INTERNAL MEDICINE

## 2025-03-06 NOTE — PROGRESS NOTES
Anal Excision Procedures    Performed by: Mo Sam MD  Authorized by: Mo Sam MD    A time out verifies correct patient, procedure, equipment, support staff and site/side marked as required:   Procedure Type: hemorrhoidectomy    Hemorrhoidectomy Details:   Hemorrhoid type: internal    Internal position:  Two o'clock  Single rubber band ligation    [facility] - Hemorrhoid Banding Markie Workman 59 y.o. male MRN: [id number]  Encounter: [id number]    ASSESSMENT AND PLAN:    The right anterior hemorrhoid area was banded today. The patient was instructed to avoid constipation and straining, and educated in appropriate fiber intake.     HPI: Markie Workman is a 59 y.o. year old male who presents with bleeding due to hemorrhoids.     Previous treatments include: Conservative  Bands were previously placed: Left lateral and right posterior  Current Fiber intake: Fiber supplementation  Complications of prior therapy include: Pain    The patient provided consent for banding  and was placed in the left lateral position  Rectal exam showed normal  The [place] ligator was advanced and a band was applied without difficulty   Repeat rectal exam confirmed appropriate placement  The patient was discharged home after observation in the waiting area